# Patient Record
Sex: MALE | Race: WHITE | HISPANIC OR LATINO | URBAN - METROPOLITAN AREA
[De-identification: names, ages, dates, MRNs, and addresses within clinical notes are randomized per-mention and may not be internally consistent; named-entity substitution may affect disease eponyms.]

---

## 2017-01-24 ENCOUNTER — EMERGENCY (EMERGENCY)
Facility: HOSPITAL | Age: 32
LOS: 1 days | Discharge: HOME | End: 2017-01-24
Attending: EMERGENCY MEDICINE
Payer: SELF-PAY

## 2017-01-24 VITALS
TEMPERATURE: 99 F | SYSTOLIC BLOOD PRESSURE: 132 MMHG | DIASTOLIC BLOOD PRESSURE: 58 MMHG | HEIGHT: 66 IN | HEART RATE: 98 BPM | OXYGEN SATURATION: 98 % | WEIGHT: 151.02 LBS | RESPIRATION RATE: 19 BRPM

## 2017-01-24 DIAGNOSIS — B34.9 VIRAL INFECTION, UNSPECIFIED: ICD-10-CM

## 2017-01-24 PROCEDURE — 99282 EMERGENCY DEPT VISIT SF MDM: CPT

## 2017-01-24 PROCEDURE — 99053 MED SERV 10PM-8AM 24 HR FAC: CPT

## 2017-01-24 PROCEDURE — 99283 EMERGENCY DEPT VISIT LOW MDM: CPT | Mod: 25

## 2017-01-24 RX ORDER — ACETAMINOPHEN 500 MG
2 TABLET ORAL
Qty: 60 | Refills: 0 | OUTPATIENT
Start: 2017-01-24 | End: 2017-01-29

## 2017-01-24 NOTE — ED PROVIDER NOTE - MEDICAL DECISION MAKING DETAILS
32 yo M with no Past Medical History that did not received flu shot this year that presents with flu like symptoms. Otherwise well appearing, no fever here. Taking tylenol for pain and fever. Eating and drinking well. Has been able to go to work. Will Rx Tamiflu as still in window, tylenol PRN and f/u with his PMD who he has appt with later this week. Return precautions explained and understood.

## 2017-01-24 NOTE — ED PROVIDER NOTE - OBJECTIVE STATEMENT
30 yo M with no Past Medical History that is not up to date with immunizations (FLU) that presents with generalized weakness, bodyaches, and low grade fever x 3 days. Started 3 days ago, fevers, chills, aches. No other symptoms such as nausea, vomiting, sore throat, chest pain, cough, rash, diarrhea, dysuria, weight loss, night sweats. Has been eating and drinking well otherwise. No known sick contacts. Lives with wife, no IVDU ever, no smoking, no drinking. Last travel to [place] 9/2016. 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

## 2021-04-15 ENCOUNTER — OUTPATIENT (OUTPATIENT)
Dept: OUTPATIENT SERVICES | Facility: HOSPITAL | Age: 36
LOS: 1 days | End: 2021-04-15

## 2021-04-15 DIAGNOSIS — Z20.822 CONTACT WITH AND (SUSPECTED) EXPOSURE TO COVID-19: ICD-10-CM

## 2021-04-15 LAB — SARS-COV-2 RNA SPEC QL NAA+PROBE: SIGNIFICANT CHANGE UP

## 2022-05-26 NOTE — ED ADULT TRIAGE NOTE - DIRECT TO ROOM CARE INITIATED:
24-Jan-2017 06:25
Alert-The patient is alert, awake and responds to voice. The patient is oriented to time, place, and person. The triage nurse is able to obtain subjective information.